# Patient Record
Sex: MALE | Race: OTHER | HISPANIC OR LATINO | URBAN - METROPOLITAN AREA
[De-identification: names, ages, dates, MRNs, and addresses within clinical notes are randomized per-mention and may not be internally consistent; named-entity substitution may affect disease eponyms.]

---

## 2024-01-24 ENCOUNTER — EMERGENCY (EMERGENCY)
Facility: HOSPITAL | Age: 29
LOS: 1 days | Discharge: HOME | End: 2024-01-24
Attending: EMERGENCY MEDICINE | Admitting: EMERGENCY MEDICINE
Payer: COMMERCIAL

## 2024-01-24 VITALS
OXYGEN SATURATION: 97 % | TEMPERATURE: 98 F | RESPIRATION RATE: 18 BRPM | SYSTOLIC BLOOD PRESSURE: 132 MMHG | WEIGHT: 205.03 LBS | DIASTOLIC BLOOD PRESSURE: 79 MMHG | HEART RATE: 96 BPM

## 2024-01-24 DIAGNOSIS — R30.0 DYSURIA: ICD-10-CM

## 2024-01-24 DIAGNOSIS — Z20.2 CONTACT WITH AND (SUSPECTED) EXPOSURE TO INFECTIONS WITH A PREDOMINANTLY SEXUAL MODE OF TRANSMISSION: ICD-10-CM

## 2024-01-24 DIAGNOSIS — M54.50 LOW BACK PAIN, UNSPECIFIED: ICD-10-CM

## 2024-01-24 DIAGNOSIS — Z88.0 ALLERGY STATUS TO PENICILLIN: ICD-10-CM

## 2024-01-24 DIAGNOSIS — R82.998 OTHER ABNORMAL FINDINGS IN URINE: ICD-10-CM

## 2024-01-24 LAB
APPEARANCE UR: CLEAR — SIGNIFICANT CHANGE UP
BACTERIA # UR AUTO: NEGATIVE /HPF — SIGNIFICANT CHANGE UP
BILIRUB UR-MCNC: NEGATIVE — SIGNIFICANT CHANGE UP
CAST: 0 /LPF — SIGNIFICANT CHANGE UP (ref 0–4)
COLOR SPEC: YELLOW — SIGNIFICANT CHANGE UP
DIFF PNL FLD: NEGATIVE — SIGNIFICANT CHANGE UP
GLUCOSE UR QL: NEGATIVE MG/DL — SIGNIFICANT CHANGE UP
HBV CORE AB SER-ACNC: SIGNIFICANT CHANGE UP
HBV SURFACE AB SER-ACNC: REACTIVE
HBV SURFACE AG SER-ACNC: SIGNIFICANT CHANGE UP
HCV AB S/CO SERPL IA: 0.03 S/CO — SIGNIFICANT CHANGE UP
HCV AB SERPL-IMP: SIGNIFICANT CHANGE UP
HIV 1+2 AB+HIV1 P24 AG SERPL QL IA: SIGNIFICANT CHANGE UP
KETONES UR-MCNC: NEGATIVE MG/DL — SIGNIFICANT CHANGE UP
LEUKOCYTE ESTERASE UR-ACNC: ABNORMAL
NITRITE UR-MCNC: NEGATIVE — SIGNIFICANT CHANGE UP
PH UR: 7 — SIGNIFICANT CHANGE UP (ref 5–8)
PROT UR-MCNC: NEGATIVE MG/DL — SIGNIFICANT CHANGE UP
RBC CASTS # UR COMP ASSIST: 0 /HPF — SIGNIFICANT CHANGE UP (ref 0–4)
SP GR SPEC: 1.01 — SIGNIFICANT CHANGE UP (ref 1–1.03)
SQUAMOUS # UR AUTO: 0 /HPF — SIGNIFICANT CHANGE UP (ref 0–5)
UROBILINOGEN FLD QL: 0.2 MG/DL — SIGNIFICANT CHANGE UP (ref 0.2–1)
WBC UR QL: 13 /HPF — HIGH (ref 0–5)

## 2024-01-24 PROCEDURE — 99284 EMERGENCY DEPT VISIT MOD MDM: CPT

## 2024-01-24 PROCEDURE — 86708 HEPATITIS A ANTIBODY: CPT

## 2024-01-24 PROCEDURE — 87086 URINE CULTURE/COLONY COUNT: CPT

## 2024-01-24 PROCEDURE — 87591 N.GONORRHOEAE DNA AMP PROB: CPT

## 2024-01-24 PROCEDURE — 87389 HIV-1 AG W/HIV-1&-2 AB AG IA: CPT

## 2024-01-24 PROCEDURE — 96372 THER/PROPH/DIAG INJ SC/IM: CPT

## 2024-01-24 PROCEDURE — 86706 HEP B SURFACE ANTIBODY: CPT

## 2024-01-24 PROCEDURE — 87491 CHLMYD TRACH DNA AMP PROBE: CPT

## 2024-01-24 PROCEDURE — 86704 HEP B CORE ANTIBODY TOTAL: CPT

## 2024-01-24 PROCEDURE — 36415 COLL VENOUS BLD VENIPUNCTURE: CPT

## 2024-01-24 PROCEDURE — 99283 EMERGENCY DEPT VISIT LOW MDM: CPT | Mod: 25

## 2024-01-24 PROCEDURE — 81001 URINALYSIS AUTO W/SCOPE: CPT

## 2024-01-24 PROCEDURE — 86803 HEPATITIS C AB TEST: CPT

## 2024-01-24 PROCEDURE — 86780 TREPONEMA PALLIDUM: CPT

## 2024-01-24 PROCEDURE — 87340 HEPATITIS B SURFACE AG IA: CPT

## 2024-01-24 RX ORDER — CEFTRIAXONE 500 MG/1
500 INJECTION, POWDER, FOR SOLUTION INTRAMUSCULAR; INTRAVENOUS ONCE
Refills: 0 | Status: COMPLETED | OUTPATIENT
Start: 2024-01-24 | End: 2024-01-24

## 2024-01-24 RX ADMIN — CEFTRIAXONE 500 MILLIGRAM(S): 500 INJECTION, POWDER, FOR SOLUTION INTRAMUSCULAR; INTRAVENOUS at 14:08

## 2024-01-24 RX ADMIN — Medication 100 MILLIGRAM(S): at 14:08

## 2024-01-24 NOTE — ED PROVIDER NOTE - PATIENT PORTAL LINK FT
You can access the FollowMyHealth Patient Portal offered by [facility] by registering at the following website: [email or web address]. By joining [employer]’s FollowMyHealth portal, you will also be able to view your health information using other applications (apps) compatible with our system.

## 2024-01-24 NOTE — ED PROVIDER NOTE - OBJECTIVE STATEMENT
28-year-old male no past medical history complaining of dysuria x 2 weeks.  Patient is feeling discomfort at the end of his stream but not during actual urination.  Patient also started to feel some lower back pain.  No associated numbness, weakness, incontinence.  No abnormal discharge, testicular pain or swelling, new rash or lesions.  Patient reports his symptoms started after unprotected intercourse with a new partner.  Patient is concerned for possible STD.  No fever, hematuria, history of kidney stone, history of similar symptoms.

## 2024-01-24 NOTE — ED PROVIDER NOTE - CLINICAL SUMMARY MEDICAL DECISION MAKING FREE TEXT BOX
Patient complaining of possible STD exposure and dysuria symptoms for approximately 2 weeks.  Exam benign without discharge or abnormal genital findings.  Symptoms concerning for possible STD versus UTI.  UA with 13 white cells and positive leuk esterase but no nitrites.  Urine culture pending STD test also sent and pending.  Patient requested empiric treatment.  I suspect patient's UA findings are more consistent with STD rather than UTI and will hold on UTI medications beyond the STD meds until urine culture results.  Patient counseled to abstain from intercourse or use protection until his test come back as well as to discuss any positive findings with his partner.

## 2024-01-24 NOTE — ED ADULT TRIAGE NOTE - CHIEF COMPLAINT QUOTE
pt presents to ER c/o burning after urination for the past two weeks. also c/o lower back discomfort for the past three days. denies fevers. states he recently started a "relationship with a new partner"

## 2024-01-24 NOTE — ED PROVIDER NOTE - PHYSICAL EXAMINATION
VITAL SIGNS: I have reviewed nursing notes and confirm.  CONSTITUTIONAL:  in no acute distress.   SKIN:  warm and dry, no acute rash.   HEAD:  normocephalic, atraumatic.  EYES: PERRL, EOM intact; conjunctiva and sclera clear.  ENT: No nasal discharge; airway clear.   NECK: Supple; non tender.  CARD: S1, S2 normal; no murmurs, gallops, or rubs. Regular rate and rhythm.   RESP:  Clear to auscultation b/l, no wheezes, rales or rhonchi.  ABD: Normal bowel sounds; soft; non-distended; non-tender; no guarding/ rebound. no cvat  : nl penis, no discharge or blood, testicles normal, nontender, no erythema, swelling, mass, lesions/rash  MSK: Normal ROM. No clubbing, cyanosis or edema. no ttp bilat le, neck and back nontender midline   NEURO: Alert, oriented, grossly unremarkable  PSYCH: Cooperative, mood and affect appropriate.

## 2024-01-24 NOTE — ED PROVIDER NOTE - NSFOLLOWUPINSTRUCTIONS_ED_ALL_ED_FT
Possible STD exposure   Dysuria     You were given empiric treatment for possible gonorrhea and chlamydia infection.  You have a urine culture that is pending as well as your other STD tests.  If your tests are negative and your symptoms are persisting please follow-up with urology.    Return for increased pain, vomiting or diarrhea, fever, penile discharge, testicular swelling, numbness/weakness in leg, incontinence, any other concerns.     Use tylenol or ibuprofen as needed for pain - use as directed.     You may call our referrals coordinator at [phone] Monday to Friday 11am-7pm for assistance with making an appointment     Please call to make appointment in urology clinic within one week if symptoms persist.  [phone] Possible STD exposure   Dysuria     You were given empiric treatment for possible gonorrhea and chlamydia infection.  You have a urine culture that is pending as well as your other STD tests.  If your tests are negative and your symptoms are persisting please follow-up with urology.    Take doxycycline twice a day for 1 week.     Return for increased pain, vomiting or diarrhea, fever, penile discharge, testicular swelling, numbness/weakness in leg, incontinence, any other concerns.     Use tylenol or ibuprofen as needed for pain - use as directed.     You may call our referrals coordinator at [phone] Monday to Friday 11am-7pm for assistance with making an appointment     Please call to make appointment in urology clinic within one week if symptoms persist.  [phone] Dysuria    Your urine showed some signs of possible infection - either UTI or possible STD related.  You were given empiric treatment for possible gonorrhea and chlamydia infection.  You have a urine culture that is pending as well as your other STD tests.  If your tests are negative and your symptoms are persisting please follow-up with urology.    Take doxycycline twice a day for 1 week.     Return for increased pain, vomiting or diarrhea, fever, penile discharge, testicular swelling, numbness/weakness in leg, incontinence, any other concerns.     Use tylenol or ibuprofen as needed for pain - use as directed.     You may call our referrals coordinator at [phone] Monday to Friday 11am-7pm for assistance with making an appointment     Please call to make appointment in urology clinic within one week if symptoms persist.  [phone]

## 2024-01-24 NOTE — ED PROVIDER NOTE - NSFOLLOWUPCLINICS_GEN_ALL_ED_FT
[facility]  Urology  [address] [place], [address]  [place], [place] [address]  Phone: [phone]  Fax:

## 2024-01-25 LAB
C TRACH RRNA SPEC QL NAA+PROBE: DETECTED
CULTURE RESULTS: NO GROWTH — SIGNIFICANT CHANGE UP
HAV IGG SER QL IA: REACTIVE
N GONORRHOEA RRNA SPEC QL NAA+PROBE: SIGNIFICANT CHANGE UP
SPECIMEN SOURCE: SIGNIFICANT CHANGE UP
SPECIMEN SOURCE: SIGNIFICANT CHANGE UP
T PALLIDUM AB TITR SER: NEGATIVE — SIGNIFICANT CHANGE UP

## 2025-06-11 ENCOUNTER — EMERGENCY (EMERGENCY)
Facility: HOSPITAL | Age: 30
LOS: 1 days | End: 2025-06-11
Attending: STUDENT IN AN ORGANIZED HEALTH CARE EDUCATION/TRAINING PROGRAM | Admitting: STUDENT IN AN ORGANIZED HEALTH CARE EDUCATION/TRAINING PROGRAM
Payer: COMMERCIAL

## 2025-06-11 VITALS
HEART RATE: 96 BPM | DIASTOLIC BLOOD PRESSURE: 84 MMHG | WEIGHT: 207.9 LBS | SYSTOLIC BLOOD PRESSURE: 122 MMHG | HEIGHT: 67 IN | OXYGEN SATURATION: 98 % | RESPIRATION RATE: 18 BRPM | TEMPERATURE: 100 F

## 2025-06-11 VITALS
OXYGEN SATURATION: 95 % | RESPIRATION RATE: 16 BRPM | HEART RATE: 84 BPM | TEMPERATURE: 98 F | SYSTOLIC BLOOD PRESSURE: 116 MMHG | DIASTOLIC BLOOD PRESSURE: 76 MMHG

## 2025-06-11 PROBLEM — Z78.9 OTHER SPECIFIED HEALTH STATUS: Chronic | Status: ACTIVE | Noted: 2024-01-24

## 2025-06-11 LAB
APPEARANCE UR: CLEAR — SIGNIFICANT CHANGE UP
BILIRUB UR-MCNC: NEGATIVE — SIGNIFICANT CHANGE UP
COLOR SPEC: YELLOW — SIGNIFICANT CHANGE UP
DIFF PNL FLD: NEGATIVE — SIGNIFICANT CHANGE UP
FLUAV AG NPH QL: SIGNIFICANT CHANGE UP
FLUBV AG NPH QL: SIGNIFICANT CHANGE UP
GLUCOSE UR QL: NEGATIVE MG/DL — SIGNIFICANT CHANGE UP
KETONES UR QL: ABNORMAL MG/DL
LEUKOCYTE ESTERASE UR-ACNC: NEGATIVE — SIGNIFICANT CHANGE UP
NITRITE UR-MCNC: NEGATIVE — SIGNIFICANT CHANGE UP
PH UR: 6.5 — SIGNIFICANT CHANGE UP (ref 5–8)
PROT UR-MCNC: SIGNIFICANT CHANGE UP MG/DL
RSV RNA NPH QL NAA+NON-PROBE: SIGNIFICANT CHANGE UP
SARS-COV-2 RNA SPEC QL NAA+PROBE: SIGNIFICANT CHANGE UP
SOURCE RESPIRATORY: SIGNIFICANT CHANGE UP
SP GR SPEC: >1.03 — HIGH (ref 1–1.03)
UROBILINOGEN FLD QL: 1 MG/DL — SIGNIFICANT CHANGE UP (ref 0.2–1)

## 2025-06-11 PROCEDURE — 71046 X-RAY EXAM CHEST 2 VIEWS: CPT

## 2025-06-11 PROCEDURE — 99284 EMERGENCY DEPT VISIT MOD MDM: CPT

## 2025-06-11 PROCEDURE — 87637 SARSCOV2&INF A&B&RSV AMP PRB: CPT

## 2025-06-11 PROCEDURE — 71046 X-RAY EXAM CHEST 2 VIEWS: CPT | Mod: 26

## 2025-06-11 PROCEDURE — 82962 GLUCOSE BLOOD TEST: CPT

## 2025-06-11 PROCEDURE — 99284 EMERGENCY DEPT VISIT MOD MDM: CPT | Mod: 25

## 2025-06-11 PROCEDURE — 81003 URINALYSIS AUTO W/O SCOPE: CPT

## 2025-06-11 RX ORDER — AZITHROMYCIN 250 MG
500 CAPSULE ORAL ONCE
Refills: 0 | Status: COMPLETED | OUTPATIENT
Start: 2025-06-11 | End: 2025-06-11

## 2025-06-11 RX ORDER — IBUPROFEN 200 MG
600 TABLET ORAL ONCE
Refills: 0 | Status: COMPLETED | OUTPATIENT
Start: 2025-06-11 | End: 2025-06-11

## 2025-06-11 RX ORDER — CEFPODOXIME PROXETIL 200 MG/1
200 TABLET, FILM COATED ORAL ONCE
Refills: 0 | Status: COMPLETED | OUTPATIENT
Start: 2025-06-11 | End: 2025-06-11

## 2025-06-11 RX ORDER — CEFPODOXIME PROXETIL 200 MG/1
1 TABLET, FILM COATED ORAL
Qty: 14 | Refills: 0
Start: 2025-06-11 | End: 2025-06-17

## 2025-06-11 RX ORDER — BENZONATATE 100 MG
1 CAPSULE ORAL
Qty: 10 | Refills: 0
Start: 2025-06-11 | End: 2025-06-15

## 2025-06-11 RX ORDER — AZITHROMYCIN 250 MG
1 CAPSULE ORAL
Qty: 6 | Refills: 0
Start: 2025-06-11 | End: 2025-06-15

## 2025-06-11 RX ORDER — BENZONATATE 100 MG
100 CAPSULE ORAL ONCE
Refills: 0 | Status: COMPLETED | OUTPATIENT
Start: 2025-06-11 | End: 2025-06-11

## 2025-06-11 RX ADMIN — Medication 100 MILLIGRAM(S): at 07:44

## 2025-06-11 RX ADMIN — Medication 600 MILLIGRAM(S): at 07:44

## 2025-06-11 NOTE — ED PROVIDER NOTE - PATIENT PORTAL LINK FT
Please advise Dea that I sent a prescription for Victoza to [facility].    This is given once a day rather than once a week.   You can access the FollowMyHealth Patient Portal offered by [facility] by registering at the following website: [email or web address]. By joining [employer]’s FollowMyHealth portal, you will also be able to view your health information using other applications (apps) compatible with our system.

## 2025-06-11 NOTE — ED PROVIDER NOTE - PHYSICAL EXAMINATION
Vitals reviewed  Gen: well appearing, nad, speaking in full sentences, no hypoxia   Skin: wwp, no rash/lesions  HEENT: ncat, eomi, mmm, airway patent   CV: rrr, no audible m/r/g  Resp: symmetrical expansion, ctab, no w/r/r  Abd: nondistended, soft/nt  Ext: FROM throughout, no peripheral edema, no muscle or joint ttp, distal pulses 2+  Neuro: alert/oriented, no focal deficits, steady gait

## 2025-06-11 NOTE — ED ADULT TRIAGE NOTE - PATIENT ON (OXYGEN DELIVERY METHOD)
The patient is Stable - Low risk of patient condition declining or worsening    Shift Goals  Clinical Goals: IV abx, monitor HR, wound care  Patient Goals: rest, comfort  Family Goals: trina    Progress made toward(s) clinical / shift goals:  pt safety maintained, IV abx per MAR. Wound care completed per flowsheets. Pt able to rest comfortably in bed and in chair for meals. Plan of care reviewed with patient at bedside, all questions addressed. Fall precautions in place, bed alarm on and plugged in, bed in lowest position and locked, pt demonstrates appropriate use of call light.       Problem: Knowledge Deficit - Standard  Goal: Patient and family/care givers will demonstrate understanding of plan of care, disease process/condition, diagnostic tests and medications  Outcome: Progressing     Problem: Fall Risk  Goal: Patient will remain free from falls  Outcome: Progressing       Patient is not progressing towards the following goals:       room air

## 2025-06-11 NOTE — ED PROVIDER NOTE - NSFOLLOWUPINSTRUCTIONS_ED_ALL_ED_FT
Please rest and remain well hydrated with plenty of fluids.  You can take motrin 600-800mg and tylenol 650mg every 3 hours, switching between the two for pain/bodyaches or fevers (>100.4F/>38C)    Take tessalon as prescribed for coughing    Please call to arrange follow up with primary care doctor within one week    Viral Respiratory Infection    A viral respiratory infection is an illness that affects parts of the body used for breathing, like the lungs, nose, and throat. It is caused by a germ called a virus. Symptoms can include runny nose, coughing, sneezing, fatigue, body aches, sore throat, fever, or headache. Over the counter medicine can be used to manage the symptoms but the infection typically goes away on its own in 5 to 10 days.     SEEK IMMEDIATE MEDICAL CARE IF YOU HAVE ANY OF THE FOLLOWING SYMPTOMS: shortness of breath, chest pain, fever over 10 days, or lightheadedness/dizziness. Please rest and remain well hydrated with plenty of fluids.  You can take motrin 600-800mg and tylenol 650mg every 3 hours, switching between the two for pain/bodyaches or fevers (>100.4F/>38C)    Take tessalon as prescribed for coughing  Please take full course of antibiotics as prescribed      Please call to arrange follow up with primary care doctor within one week    Pneumonia    Pneumonia is an infection of the lungs. Pneumonia may be caused by bacteria, viruses, or funguses. Symptoms include coughing, fever, chest pain when breathing deeply or coughing, shortness of breath, fatigue, or muscle aches. Pneumonia can be diagnosed with a medical history and physical exam, as well as other tests which may include a chest X-ray. If you were prescribed an antibiotic medicine, take it as told by your health care provider and do not stop taking the antibiotic even if you start to feel better. Do not use tobacco products, including cigarettes, chewing tobacco, and e-cigarettes.    SEEK IMMEDIATE MEDICAL CARE IF YOU HAVE ANY OF THE FOLLOWING SYMPTOMS: worsening shortness of breath, worsening chest pain, coughing up blood, change in mental status, lightheadedness/dizziness.

## 2025-06-11 NOTE — ED PROVIDER NOTE - CLINICAL SUMMARY MEDICAL DECISION MAKING FREE TEXT BOX
29 M denies pmh p/w productive cough x 2 days w/ subjective fevers and chest pain w/ coughing only.  on exam vss, afebrile, nad, b/l breath sounds, no w/r, abd soft/nt, no cvat.  suspect viral uri vs pneumonia.  r/o hyperglycemia vs uti.  will obtain viral swab, urine, cxr and give motrin/tessalon 29 M denies pmh p/w productive cough x 2 days w/ subjective fevers and chest pain w/ coughing only.  on exam vss, afebrile, nad, b/l breath sounds, no w/r, abd soft/nt, no cvat.  suspect viral uri vs pneumonia.  r/o hyperglycemia vs uti.  will obtain viral swab, urine, cxr and give motrin/tessalon    cxr w/ RLL infiltrate.  will cover for CAP and educated on supportive care.  discussed strict return parameters

## 2025-06-11 NOTE — ED ADULT TRIAGE NOTE - CHIEF COMPLAINT QUOTE
Patient c/o cough for 2 days with green sputum and subjective fevers at home; also c/o urinary frequency

## 2025-06-11 NOTE — ED ADULT NURSE NOTE - NSFALLUNIVINTERV_ED_ALL_ED
Bed/Stretcher in lowest position, wheels locked, appropriate side rails in place/Call bell, personal items and telephone in reach/Instruct patient to call for assistance before getting out of bed/chair/stretcher/Non-slip footwear applied when patient is off stretcher/[place] to call system/Physically safe environment - no spills, clutter or unnecessary equipment/Purposeful proactive rounding/Room/bathroom lighting operational, light cord in reach

## 2025-06-11 NOTE — ED ADULT NURSE NOTE - OBJECTIVE STATEMENT
29M presents to ER for productive cough, body aches, subjective fevers at home. Patient A&Ox4, able to verbalize needs, follows commands. On RA with adequate chest rise and fall. No s/s of acute distress noted. Patient noted to have "wet' cough during initial RN assessment. Endorses taking theraflu yesterday as well as a medication for flu-like symptoms this AM (bought from [employer], unsure of name). Also endorsing urinary symptoms x one month (urine sample provided noted to be dark yellow).

## 2025-06-11 NOTE — ED PROVIDER NOTE - OBJECTIVE STATEMENT
29 M denies pmh p/w cough x 2 days.  pt reports progressively worsening cough w/ green sputum and chest discomfort w/ coughing.  no sob or palpitations.  +subjective fevers, loose diarrhea and congestion as well.  also reports intermittent urinary frequency for about a month. 29 M denies pmh p/w cough x 2 days.  pt reports progressively worsening cough w/ green sputum and chest discomfort w/ coughing.  no sob or palpitations.  +subjective fevers, loose diarrhea and congestion as well.  also reports intermittent urinary frequency for about a month.  denies chills, HA, dizziness, fainting, abd pain, nvd, leg pain/swelling, travel, sick contacts, trauma

## 2025-06-13 DIAGNOSIS — R35.0 FREQUENCY OF MICTURITION: ICD-10-CM

## 2025-06-13 DIAGNOSIS — R05.1 ACUTE COUGH: ICD-10-CM

## 2025-06-13 DIAGNOSIS — R91.8 OTHER NONSPECIFIC ABNORMAL FINDING OF LUNG FIELD: ICD-10-CM

## 2025-06-13 DIAGNOSIS — Z88.0 ALLERGY STATUS TO PENICILLIN: ICD-10-CM
